# Patient Record
Sex: FEMALE | Race: WHITE | NOT HISPANIC OR LATINO | Employment: FULL TIME | URBAN - METROPOLITAN AREA
[De-identification: names, ages, dates, MRNs, and addresses within clinical notes are randomized per-mention and may not be internally consistent; named-entity substitution may affect disease eponyms.]

---

## 2022-11-27 ENCOUNTER — HOSPITAL ENCOUNTER (OUTPATIENT)
Facility: HOSPITAL | Age: 42
Discharge: HOME | End: 2022-11-27
Attending: EMERGENCY MEDICINE | Admitting: EMERGENCY MEDICINE

## 2022-11-27 VITALS
DIASTOLIC BLOOD PRESSURE: 95 MMHG | HEIGHT: 68 IN | HEART RATE: 74 BPM | TEMPERATURE: 97.8 F | OXYGEN SATURATION: 99 % | BODY MASS INDEX: 31.83 KG/M2 | RESPIRATION RATE: 18 BRPM | WEIGHT: 210 LBS | SYSTOLIC BLOOD PRESSURE: 148 MMHG

## 2022-11-27 DIAGNOSIS — N30.90 CYSTITIS: Primary | ICD-10-CM

## 2022-11-27 LAB
BILIRUB UR QL STRIP: NEGATIVE
CLARITY UR: CLEAR
COLOR UR: YELLOW
GLUCOSE UR STRIP-MCNC: NEGATIVE MG/DL
HGB UR QL STRIP.AUTO: ABNORMAL
KETONES UR QL STRIP: NEGATIVE
LEUKOCYTE ESTERASE UR QL STRIP.AUTO: ABNORMAL
NITRITE UR QL STRIP: NEGATIVE
PH UR STRIP.AUTO: 8 [PH] (ref 5–8)
PROT UR QL STRIP: NEGATIVE
SP GR UR STRIP: 1.02 (ref 1–1.03)
UROBILINOGEN UR QL STRIP: ABNORMAL

## 2022-11-27 PROCEDURE — 99203 OFFICE O/P NEW LOW 30 MIN: CPT | Performed by: EMERGENCY MEDICINE

## 2022-11-27 PROCEDURE — 81003 URINALYSIS AUTO W/O SCOPE: CPT | Performed by: EMERGENCY MEDICINE

## 2022-11-27 PROCEDURE — G0463 HOSPITAL OUTPT CLINIC VISIT: HCPCS | Performed by: EMERGENCY MEDICINE

## 2022-11-27 RX ORDER — SULFAMETHOXAZOLE AND TRIMETHOPRIM 800; 160 MG/1; MG/1
1 TABLET ORAL 2 TIMES DAILY
Qty: 14 TABLET | Refills: 0 | Status: SHIPPED | OUTPATIENT
Start: 2022-11-27

## 2025-02-17 ENCOUNTER — HOSPITAL ENCOUNTER (OUTPATIENT)
Facility: HOSPITAL | Age: 45
Discharge: HOME | End: 2025-02-17
Attending: EMERGENCY MEDICINE | Admitting: EMERGENCY MEDICINE
Payer: MEDICAID

## 2025-02-17 ENCOUNTER — APPOINTMENT (OUTPATIENT)
Dept: GENERAL RADIOLOGY | Facility: HOSPITAL | Age: 45
End: 2025-02-17
Payer: MEDICAID

## 2025-02-17 VITALS
HEART RATE: 79 BPM | WEIGHT: 229.9 LBS | OXYGEN SATURATION: 96 % | HEIGHT: 68 IN | SYSTOLIC BLOOD PRESSURE: 141 MMHG | TEMPERATURE: 98.7 F | RESPIRATION RATE: 18 BRPM | BODY MASS INDEX: 34.84 KG/M2 | DIASTOLIC BLOOD PRESSURE: 90 MMHG

## 2025-02-17 DIAGNOSIS — J06.9 VIRAL UPPER RESPIRATORY TRACT INFECTION WITH COUGH: Primary | ICD-10-CM

## 2025-02-17 PROCEDURE — G0463 HOSPITAL OUTPT CLINIC VISIT: HCPCS | Performed by: NURSE PRACTITIONER

## 2025-02-17 PROCEDURE — 71046 X-RAY EXAM CHEST 2 VIEWS: CPT

## 2025-02-17 NOTE — Clinical Note
[facility] [place] [id number] [place] [address] [place] [address]  Phone: [phone]    Komal Ramirez was seen and treated in our emergency department on 2/17/2025.  She may return to work on 02/18/2025.         Thank you for choosing [facility].    Angy Worley APRN

## 2025-02-17 NOTE — FSED PROVIDER NOTE
Subjective   History of Present Illness  The patient is a 44-year-old female who presents to the ER with cough and congestion for the past 3 weeks.  Patient has been taking Motrin over-the-counter but nothing else.    History provided by:  Patient   used: No        Review of Systems   HENT:  Positive for congestion.    Respiratory:  Positive for cough.        Past Medical History:   Diagnosis Date    Hypertension        Allergies   Allergen Reactions    Penicillins Anaphylaxis       No past surgical history on file.    No family history on file.    Social History     Socioeconomic History    Marital status: Single   Tobacco Use    Smoking status: Former     Types: Cigarettes   Substance and Sexual Activity    Alcohol use: Never    Drug use: Never           Objective   Physical Exam  Vitals and nursing note reviewed.   Constitutional:       Appearance: Normal appearance.   HENT:      Head: Normocephalic.      Right Ear: Tympanic membrane and ear canal normal.      Left Ear: Tympanic membrane and ear canal normal.      Nose: Nose normal.      Mouth/Throat:      Lips: Pink.      Mouth: Mucous membranes are moist.      Pharynx: Oropharynx is clear. Uvula midline.   Eyes:      Conjunctiva/sclera: Conjunctivae normal.      Pupils: Pupils are equal, round, and reactive to light.   Cardiovascular:      Rate and Rhythm: Normal rate and regular rhythm.      Pulses: Normal pulses.      Heart sounds: Normal heart sounds.   Pulmonary:      Effort: Pulmonary effort is normal.      Breath sounds: Normal breath sounds and air entry.   Abdominal:      General: Bowel sounds are normal.      Palpations: Abdomen is soft.   Musculoskeletal:         General: Normal range of motion.      Cervical back: Full passive range of motion without pain, normal range of motion and neck supple.   Skin:     General: Skin is warm and dry.   Neurological:      General: No focal deficit present.      Mental Status: She is alert and  oriented to person, place, and time.   Psychiatric:         Mood and Affect: Mood normal.         Behavior: Behavior normal. Behavior is cooperative.         Procedures           ED Course  ED Course as of 02/17/25 1012   Mon Feb 17, 2025   0951 XR CHEST PA AND LATERAL     Date of Exam: 2/17/2025 9:26 AM EST     Indication: cough x 3 wks     Comparison: None available.     Findings:  Cardiomediastinal silhouette is unremarkable.  No airspace disease, pneumothorax, nor pleural effusion. No acute osseous abnormality identified.     IMPRESSION:  Impression:  No acute process identified      [DS]      ED Course User Index  [DS] Angy Worley APRN                                           Medical Decision Making  The patient is a 44-year-old female who presents to the ER with cough and congestion for the past 3 weeks.  Patient has been taking Motrin over-the-counter but nothing else.  Suspect patient has viral upper respiratory infection. Based on history and physical doubt sinusitis. Do not suspect underlying cardiopulmonary process. I considered, but think unlikely, dangerous causes of this patient's symptoms to include ACS, CHF or COPD exacerbations, pneumonia, pneumothorax. Patient is nontoxic appearing and not in need of emergent medical intervention.  Advised patient to get Mucinex, Robitussin Triaminic or Delsym over-the-counter.  Also advised patient make sure they are drinking plenty of fluids.    Chest x-ray is unremarkable      Problems Addressed:  Viral upper respiratory tract infection with cough: complicated acute illness or injury    Amount and/or Complexity of Data Reviewed  Radiology: ordered. Decision-making details documented in ED Course.    Risk  OTC drugs.        Final diagnoses:   Viral upper respiratory tract infection with cough       ED Disposition  ED Disposition       ED Disposition   Discharge    Condition   Stable    Comment   --               Daisy Arizmendi, APRN  [address]  [facility] [address]  [phone]    Schedule an appointment as soon as possible for a visit in 1 week  As needed, If symptoms worsen         Medication List      No changes were made to your prescriptions during this visit.

## 2025-02-17 NOTE — DISCHARGE INSTRUCTIONS
Call for a follow up appointment with your primary care for further evaluation and treatment,     Tylenol/Motrin as needed for pain/fevers    You can get over-the-counter cough medication such as Delsym, Triaminic, Robitussin and take as per  directions.    You can also get Mucinex over-the-counter.  Intake is manufacturing directions    Make sure patient is drinking plenty of fluids.    Return for any new or worsening symptoms.      Voice recognition transcription technology was used for documentation on this chart.  Result there may be some typos and/or introduced into the chart that were overlooked during editing reviewing.